# Patient Record
Sex: MALE | Race: BLACK OR AFRICAN AMERICAN | Employment: UNEMPLOYED | URBAN - METROPOLITAN AREA
[De-identification: names, ages, dates, MRNs, and addresses within clinical notes are randomized per-mention and may not be internally consistent; named-entity substitution may affect disease eponyms.]

---

## 2023-02-26 ENCOUNTER — APPOINTMENT (OUTPATIENT)
Dept: GENERAL RADIOLOGY | Facility: HOSPITAL | Age: 28
End: 2023-02-26
Attending: EMERGENCY MEDICINE
Payer: MEDICAID

## 2023-02-26 ENCOUNTER — HOSPITAL ENCOUNTER (OUTPATIENT)
Facility: HOSPITAL | Age: 28
Setting detail: OBSERVATION
Discharge: HOME | End: 2023-02-27
Attending: EMERGENCY MEDICINE | Admitting: ORTHOPAEDIC SURGERY
Payer: MEDICAID

## 2023-02-26 ENCOUNTER — ANESTHESIA EVENT (OUTPATIENT)
Dept: SURGERY | Facility: HOSPITAL | Age: 28
End: 2023-02-26
Payer: MEDICAID

## 2023-02-26 ENCOUNTER — APPOINTMENT (OUTPATIENT)
Dept: CT IMAGING | Facility: HOSPITAL | Age: 28
End: 2023-02-26
Attending: EMERGENCY MEDICINE
Payer: MEDICAID

## 2023-02-26 DIAGNOSIS — S63.266A CLOSED DISLOCATION OF FIFTH METACARPAL BONE OF RIGHT HAND: Primary | ICD-10-CM

## 2023-02-26 DIAGNOSIS — T14.8XXA FRACTURE: Primary | ICD-10-CM

## 2023-02-26 LAB
ALBUMIN SERPL-MCNC: 3.1 G/DL (ref 3.4–5)
ALBUMIN/GLOB SERPL: 1 {RATIO} (ref 1–2)
ALP LIVER SERPL-CCNC: 55 U/L
ALT SERPL-CCNC: 41 U/L
ANION GAP SERPL CALC-SCNC: 5 MMOL/L (ref 0–18)
AST SERPL-CCNC: 49 U/L (ref 15–37)
BASOPHILS # BLD AUTO: 0.05 X10(3) UL (ref 0–0.2)
BASOPHILS NFR BLD AUTO: 0.8 %
BILIRUB SERPL-MCNC: 0.2 MG/DL (ref 0.1–2)
BUN BLD-MCNC: 9 MG/DL (ref 7–18)
CALCIUM BLD-MCNC: 7.9 MG/DL (ref 8.5–10.1)
CHLORIDE SERPL-SCNC: 111 MMOL/L (ref 98–112)
CO2 SERPL-SCNC: 26 MMOL/L (ref 21–32)
CREAT BLD-MCNC: 1.24 MG/DL
EOSINOPHIL # BLD AUTO: 0.07 X10(3) UL (ref 0–0.7)
EOSINOPHIL NFR BLD AUTO: 1.1 %
ERYTHROCYTE [DISTWIDTH] IN BLOOD BY AUTOMATED COUNT: 11.9 %
ETHANOL SERPL-MCNC: 195 MG/DL (ref ?–3)
GFR SERPLBLD BASED ON 1.73 SQ M-ARVRAT: 81 ML/MIN/1.73M2 (ref 60–?)
GLOBULIN PLAS-MCNC: 3 G/DL (ref 2.8–4.4)
GLUCOSE BLD-MCNC: 97 MG/DL (ref 70–99)
HCT VFR BLD AUTO: 42.7 %
HGB BLD-MCNC: 14.6 G/DL
IMM GRANULOCYTES # BLD AUTO: 0.02 X10(3) UL (ref 0–1)
IMM GRANULOCYTES NFR BLD: 0.3 %
LYMPHOCYTES # BLD AUTO: 2.54 X10(3) UL (ref 1–4)
LYMPHOCYTES NFR BLD AUTO: 40.4 %
MCH RBC QN AUTO: 30.9 PG (ref 26–34)
MCHC RBC AUTO-ENTMCNC: 34.2 G/DL (ref 31–37)
MCV RBC AUTO: 90.3 FL
MONOCYTES # BLD AUTO: 0.59 X10(3) UL (ref 0.1–1)
MONOCYTES NFR BLD AUTO: 9.4 %
NEUTROPHILS # BLD AUTO: 3.02 X10 (3) UL (ref 1.5–7.7)
NEUTROPHILS # BLD AUTO: 3.02 X10(3) UL (ref 1.5–7.7)
NEUTROPHILS NFR BLD AUTO: 48 %
OSMOLALITY SERPL CALC.SUM OF ELEC: 293 MOSM/KG (ref 275–295)
PLATELET # BLD AUTO: 204 10(3)UL (ref 150–450)
POTASSIUM SERPL-SCNC: 3.7 MMOL/L (ref 3.5–5.1)
PROT SERPL-MCNC: 6.1 G/DL (ref 6.4–8.2)
RBC # BLD AUTO: 4.73 X10(6)UL
SARS-COV-2 RNA RESP QL NAA+PROBE: NOT DETECTED
SODIUM SERPL-SCNC: 142 MMOL/L (ref 136–145)
WBC # BLD AUTO: 6.3 X10(3) UL (ref 4–11)

## 2023-02-26 PROCEDURE — 99285 EMERGENCY DEPT VISIT HI MDM: CPT

## 2023-02-26 PROCEDURE — 99152 MOD SED SAME PHYS/QHP 5/>YRS: CPT

## 2023-02-26 PROCEDURE — 73200 CT UPPER EXTREMITY W/O DYE: CPT | Performed by: EMERGENCY MEDICINE

## 2023-02-26 PROCEDURE — 73130 X-RAY EXAM OF HAND: CPT | Performed by: EMERGENCY MEDICINE

## 2023-02-26 PROCEDURE — 36415 COLL VENOUS BLD VENIPUNCTURE: CPT

## 2023-02-26 PROCEDURE — 80053 COMPREHEN METABOLIC PANEL: CPT | Performed by: EMERGENCY MEDICINE

## 2023-02-26 PROCEDURE — 85025 COMPLETE CBC W/AUTO DIFF WBC: CPT | Performed by: EMERGENCY MEDICINE

## 2023-02-26 PROCEDURE — 82077 ASSAY SPEC XCP UR&BREATH IA: CPT | Performed by: EMERGENCY MEDICINE

## 2023-02-26 RX ORDER — HYDROCODONE BITARTRATE AND ACETAMINOPHEN 5; 325 MG/1; MG/1
1 TABLET ORAL EVERY 6 HOURS PRN
Status: DISCONTINUED | OUTPATIENT
Start: 2023-02-26 | End: 2023-02-27

## 2023-02-26 RX ORDER — SODIUM CHLORIDE 9 MG/ML
INJECTION, SOLUTION INTRAVENOUS CONTINUOUS
Status: DISCONTINUED | OUTPATIENT
Start: 2023-02-26 | End: 2023-02-27

## 2023-02-26 RX ORDER — ONDANSETRON 2 MG/ML
4 INJECTION INTRAMUSCULAR; INTRAVENOUS EVERY 6 HOURS PRN
Status: DISCONTINUED | OUTPATIENT
Start: 2023-02-26 | End: 2023-02-27

## 2023-02-26 RX ORDER — HYDROMORPHONE HYDROCHLORIDE 1 MG/ML
0.2 INJECTION, SOLUTION INTRAMUSCULAR; INTRAVENOUS; SUBCUTANEOUS
Status: DISCONTINUED | OUTPATIENT
Start: 2023-02-26 | End: 2023-02-27

## 2023-02-26 NOTE — ED QUICK NOTES
Orders for admission, patient is aware of plan and ready to go upstairs. Any questions, please call ED ADY Anand at [id number]. Vaccinated? Type of COVID test sent:rapid  COVID Suspicion level: negative      Titratable drug(s) infusing:  Rate:    LOC at time of transport: A+Ox4  Other pertinent information:    CIWA score=  NIH score=

## 2023-02-26 NOTE — PLAN OF CARE
Patient awaiting surgery tomorrow. Pain controlled on dilaudid. Vitals stable, hand numb, MD aware. Problem: PAIN - ADULT  Goal: Verbalizes/displays adequate comfort level or patient's stated pain goal  Description: INTERVENTIONS:  - Encourage pt to monitor pain and request assistance  - Assess pain using appropriate pain scale  - Administer analgesics based on type and severity of pain and evaluate response  - Implement non-pharmacological measures as appropriate and evaluate response  - Consider cultural and social influences on pain and pain management  - Manage/alleviate anxiety  - Utilize distraction and/or relaxation techniques  - Monitor for opioid side effects  - Notify MD/LIP if interventions unsuccessful or patient reports new pain  - Anticipate increased pain with activity and pre-medicate as appropriate  Outcome: Progressing     Problem: RISK FOR INFECTION - ADULT  Goal: Absence of fever/infection during anticipated neutropenic period  Description: INTERVENTIONS  - Monitor WBC  - Administer growth factors as ordered  - Implement neutropenic guidelines  Outcome: Progressing     Problem: SAFETY ADULT - FALL  Goal: Free from fall injury  Description: INTERVENTIONS:  - Assess pt frequently for physical needs  - Identify cognitive and physical deficits and behaviors that affect risk of falls.   - [place] fall precautions as indicated by assessment.  - Educate pt/family on patient safety including physical limitations  - Instruct pt to call for assistance with activity based on assessment  - Modify environment to reduce risk of injury  - Provide assistive devices as appropriate  - Consider OT/PT consult to assist with strengthening/mobility  - Encourage toileting schedule  Outcome: Progressing     Problem: DISCHARGE PLANNING  Goal: Discharge to home or other facility with appropriate resources  Description: INTERVENTIONS:  - Identify barriers to discharge w/pt and caregiver  - Include patient/family/discharge partner in discharge planning  - Arrange for needed discharge resources and transportation as appropriate  - Identify discharge learning needs (meds, wound care, etc)  - Arrange for interpreters to assist at discharge as needed  - Consider post-discharge preferences of patient/family/discharge partner  - Complete POLST form as appropriate  - Assess patient's ability to be responsible for managing their own health  - Refer to Case Management Department for coordinating discharge planning if the patient needs post-hospital services based on physician/LIP order or complex needs related to functional status, cognitive ability or social support system  Outcome: Progressing

## 2023-02-26 NOTE — CONSULTS
Consult received    Right 4th and 5th metacarpal base fracture dislocations    Will read Ct    Admit to observation  NPO  OR this morning for right hand closed reduction versus open reduction and pinning of 4th and 5th carpometacarpal joints    Pain control    Jailene Wilson MD  [facility]

## 2023-02-26 NOTE — PROGRESS NOTES
NURSING ADMISSION NOTE      Patient admitted  Oriented to room. Safety precautions initiated. Bed in low position. Call light in reach. In bed, started to unwrap ace bandage that surrounds splint to fingers/wrist, d/t it being \"tight\" & causing pain. On call ortho apn called back see orders. Updated on numbness to fingertips&  pain med request. No internal med doc needed per ortho apn.

## 2023-02-27 ENCOUNTER — ANESTHESIA (OUTPATIENT)
Dept: SURGERY | Facility: HOSPITAL | Age: 28
End: 2023-02-27
Payer: MEDICAID

## 2023-02-27 VITALS
HEART RATE: 67 BPM | WEIGHT: 220 LBS | DIASTOLIC BLOOD PRESSURE: 77 MMHG | SYSTOLIC BLOOD PRESSURE: 137 MMHG | RESPIRATION RATE: 15 BRPM | OXYGEN SATURATION: 100 % | TEMPERATURE: 99 F

## 2023-02-27 PROCEDURE — 0RSS0ZZ REPOSITION RIGHT CARPOMETACARPAL JOINT, OPEN APPROACH: ICD-10-PCS | Performed by: ORTHOPAEDIC SURGERY

## 2023-02-27 PROCEDURE — 0PSP04Z REPOSITION RIGHT METACARPAL WITH INTERNAL FIXATION DEVICE, OPEN APPROACH: ICD-10-PCS | Performed by: ORTHOPAEDIC SURGERY

## 2023-02-27 PROCEDURE — 0PSM04Z REPOSITION RIGHT CARPAL WITH INTERNAL FIXATION DEVICE, OPEN APPROACH: ICD-10-PCS | Performed by: ORTHOPAEDIC SURGERY

## 2023-02-27 DEVICE — WIRE K SMALL .045: Type: IMPLANTABLE DEVICE | Site: HAND | Status: FUNCTIONAL

## 2023-02-27 RX ORDER — SODIUM CHLORIDE, SODIUM LACTATE, POTASSIUM CHLORIDE, CALCIUM CHLORIDE 600; 310; 30; 20 MG/100ML; MG/100ML; MG/100ML; MG/100ML
INJECTION, SOLUTION INTRAVENOUS CONTINUOUS
Status: DISCONTINUED | OUTPATIENT
Start: 2023-02-27 | End: 2023-02-27 | Stop reason: HOSPADM

## 2023-02-27 RX ORDER — HYDROCODONE BITARTRATE AND ACETAMINOPHEN 5; 325 MG/1; MG/1
1 TABLET ORAL ONCE AS NEEDED
Status: DISCONTINUED | OUTPATIENT
Start: 2023-02-27 | End: 2023-02-27 | Stop reason: HOSPADM

## 2023-02-27 RX ORDER — HYDROMORPHONE HYDROCHLORIDE 1 MG/ML
INJECTION, SOLUTION INTRAMUSCULAR; INTRAVENOUS; SUBCUTANEOUS AS NEEDED
Status: DISCONTINUED | OUTPATIENT
Start: 2023-02-27 | End: 2023-02-27 | Stop reason: SURG

## 2023-02-27 RX ORDER — HYDROMORPHONE HYDROCHLORIDE 1 MG/ML
0.6 INJECTION, SOLUTION INTRAMUSCULAR; INTRAVENOUS; SUBCUTANEOUS EVERY 5 MIN PRN
Status: DISCONTINUED | OUTPATIENT
Start: 2023-02-27 | End: 2023-02-27 | Stop reason: HOSPADM

## 2023-02-27 RX ORDER — LIDOCAINE HYDROCHLORIDE 10 MG/ML
INJECTION, SOLUTION INFILTRATION; PERINEURAL AS NEEDED
Status: DISCONTINUED | OUTPATIENT
Start: 2023-02-27 | End: 2023-02-27 | Stop reason: HOSPADM

## 2023-02-27 RX ORDER — DIPHENHYDRAMINE HYDROCHLORIDE 50 MG/ML
12.5 INJECTION INTRAMUSCULAR; INTRAVENOUS AS NEEDED
Status: DISCONTINUED | OUTPATIENT
Start: 2023-02-27 | End: 2023-02-27 | Stop reason: HOSPADM

## 2023-02-27 RX ORDER — MEPERIDINE HYDROCHLORIDE 25 MG/ML
12.5 INJECTION INTRAMUSCULAR; INTRAVENOUS; SUBCUTANEOUS AS NEEDED
Status: DISCONTINUED | OUTPATIENT
Start: 2023-02-27 | End: 2023-02-27 | Stop reason: HOSPADM

## 2023-02-27 RX ORDER — DEXAMETHASONE SODIUM PHOSPHATE 4 MG/ML
VIAL (ML) INJECTION AS NEEDED
Status: DISCONTINUED | OUTPATIENT
Start: 2023-02-27 | End: 2023-02-27 | Stop reason: SURG

## 2023-02-27 RX ORDER — ACETAMINOPHEN 500 MG
1000 TABLET ORAL ONCE AS NEEDED
Status: DISCONTINUED | OUTPATIENT
Start: 2023-02-27 | End: 2023-02-27 | Stop reason: HOSPADM

## 2023-02-27 RX ORDER — MIDAZOLAM HYDROCHLORIDE 1 MG/ML
INJECTION INTRAMUSCULAR; INTRAVENOUS AS NEEDED
Status: DISCONTINUED | OUTPATIENT
Start: 2023-02-27 | End: 2023-02-27 | Stop reason: SURG

## 2023-02-27 RX ORDER — LIDOCAINE HYDROCHLORIDE 10 MG/ML
INJECTION, SOLUTION EPIDURAL; INFILTRATION; INTRACAUDAL; PERINEURAL AS NEEDED
Status: DISCONTINUED | OUTPATIENT
Start: 2023-02-27 | End: 2023-02-27 | Stop reason: SURG

## 2023-02-27 RX ORDER — HYDROMORPHONE HYDROCHLORIDE 1 MG/ML
0.4 INJECTION, SOLUTION INTRAMUSCULAR; INTRAVENOUS; SUBCUTANEOUS EVERY 5 MIN PRN
Status: DISCONTINUED | OUTPATIENT
Start: 2023-02-27 | End: 2023-02-27 | Stop reason: HOSPADM

## 2023-02-27 RX ORDER — MIDAZOLAM HYDROCHLORIDE 1 MG/ML
INJECTION INTRAMUSCULAR; INTRAVENOUS
Status: COMPLETED
Start: 2023-02-27 | End: 2023-02-27

## 2023-02-27 RX ORDER — CEFAZOLIN SODIUM 1 G/3ML
INJECTION, POWDER, FOR SOLUTION INTRAMUSCULAR; INTRAVENOUS AS NEEDED
Status: DISCONTINUED | OUTPATIENT
Start: 2023-02-27 | End: 2023-02-27 | Stop reason: SURG

## 2023-02-27 RX ORDER — ONDANSETRON 2 MG/ML
INJECTION INTRAMUSCULAR; INTRAVENOUS AS NEEDED
Status: DISCONTINUED | OUTPATIENT
Start: 2023-02-27 | End: 2023-02-27 | Stop reason: SURG

## 2023-02-27 RX ORDER — HYDROCODONE BITARTRATE AND ACETAMINOPHEN 5; 325 MG/1; MG/1
1 TABLET ORAL EVERY 4 HOURS PRN
Qty: 20 TABLET | Refills: 0 | Status: SHIPPED | OUTPATIENT
Start: 2023-02-27 | End: 2023-02-27

## 2023-02-27 RX ORDER — HYDROCODONE BITARTRATE AND ACETAMINOPHEN 5; 325 MG/1; MG/1
1-2 TABLET ORAL EVERY 4 HOURS PRN
Qty: 20 TABLET | Refills: 0 | Status: SHIPPED | OUTPATIENT
Start: 2023-02-27

## 2023-02-27 RX ORDER — BUPIVACAINE HYDROCHLORIDE 5 MG/ML
INJECTION, SOLUTION EPIDURAL; INTRACAUDAL AS NEEDED
Status: DISCONTINUED | OUTPATIENT
Start: 2023-02-27 | End: 2023-02-27 | Stop reason: HOSPADM

## 2023-02-27 RX ORDER — NALOXONE HYDROCHLORIDE 0.4 MG/ML
80 INJECTION, SOLUTION INTRAMUSCULAR; INTRAVENOUS; SUBCUTANEOUS AS NEEDED
Status: DISCONTINUED | OUTPATIENT
Start: 2023-02-27 | End: 2023-02-27 | Stop reason: HOSPADM

## 2023-02-27 RX ORDER — MIDAZOLAM HYDROCHLORIDE 1 MG/ML
1 INJECTION INTRAMUSCULAR; INTRAVENOUS EVERY 5 MIN PRN
Status: DISCONTINUED | OUTPATIENT
Start: 2023-02-27 | End: 2023-02-27 | Stop reason: HOSPADM

## 2023-02-27 RX ORDER — HYDROCODONE BITARTRATE AND ACETAMINOPHEN 5; 325 MG/1; MG/1
2 TABLET ORAL ONCE AS NEEDED
Status: DISCONTINUED | OUTPATIENT
Start: 2023-02-27 | End: 2023-02-27 | Stop reason: HOSPADM

## 2023-02-27 RX ORDER — ONDANSETRON 2 MG/ML
4 INJECTION INTRAMUSCULAR; INTRAVENOUS EVERY 6 HOURS PRN
Status: DISCONTINUED | OUTPATIENT
Start: 2023-02-27 | End: 2023-02-27 | Stop reason: HOSPADM

## 2023-02-27 RX ORDER — PROCHLORPERAZINE EDISYLATE 5 MG/ML
5 INJECTION INTRAMUSCULAR; INTRAVENOUS EVERY 8 HOURS PRN
Status: DISCONTINUED | OUTPATIENT
Start: 2023-02-27 | End: 2023-02-27 | Stop reason: HOSPADM

## 2023-02-27 RX ADMIN — CEFAZOLIN SODIUM 2 G: 1 INJECTION, POWDER, FOR SOLUTION INTRAMUSCULAR; INTRAVENOUS at 13:00:00

## 2023-02-27 RX ADMIN — DEXAMETHASONE SODIUM PHOSPHATE 8 MG: 4 MG/ML VIAL (ML) INJECTION at 12:57:00

## 2023-02-27 RX ADMIN — SODIUM CHLORIDE: 9 INJECTION, SOLUTION INTRAVENOUS at 14:16:00

## 2023-02-27 RX ADMIN — LIDOCAINE HYDROCHLORIDE 50 MG: 10 INJECTION, SOLUTION EPIDURAL; INFILTRATION; INTRACAUDAL; PERINEURAL at 12:54:00

## 2023-02-27 RX ADMIN — SODIUM CHLORIDE: 9 INJECTION, SOLUTION INTRAVENOUS at 12:50:00

## 2023-02-27 RX ADMIN — ONDANSETRON 4 MG: 2 INJECTION INTRAMUSCULAR; INTRAVENOUS at 13:17:00

## 2023-02-27 RX ADMIN — HYDROMORPHONE HYDROCHLORIDE 1 MG: 1 INJECTION, SOLUTION INTRAMUSCULAR; INTRAVENOUS; SUBCUTANEOUS at 13:04:00

## 2023-02-27 RX ADMIN — MIDAZOLAM HYDROCHLORIDE 2 MG: 1 INJECTION INTRAMUSCULAR; INTRAVENOUS at 12:49:00

## 2023-02-27 NOTE — PLAN OF CARE
ED admit 2/26 w/ Rt hand Fx, Pt is AAOX4, VSS, room air, Rt hand in post mold and ACE wrap, plan for ORIF today, Pt is up ad rosa, voiding freely to restroom, see MAR for pain meds given, Pt doing well, all needs met, all safety measures in place, call light within reach, will CTM.

## 2023-02-27 NOTE — BRIEF OP NOTE
Pre-Operative Diagnosis:RIGHT HAND FRACTURE AT THE BASE OF THE  4TH AND 5TH CMC AND HAMATE   Post-Operative Diagnosis::RIGHT HAND FRACTURE AT THE BASE OF THE  4TH AND 5TH CMC AND HAMATE    Procedure Performed:   HAND OPEN REDUCTION INTERNAL FIXATION- Right Hand Closed Reduction of 4th and 5th Metacarpal Fracture  AND HAMATE EXCISION    Surgeon(s) and Role: No Crow MD - Primary    Assistant(s):  PA: CORNELL Castillo     Surgical Findings: None     Specimen: None     Estimated Blood Loss: Blood Output: 5 mL (2/27/2023  1:47 PM)      Plan:    Patient is cleared to be discharged to home today once medically stable and pain is well managed. Pain meds prescription signed. Patient is to remain in dressing until follow up visit. He will follow up with Dr. Penelope Henry in clinic in one week.      Pasha Hook  2/27/2023  2:08 PM

## 2023-02-27 NOTE — PLAN OF CARE
Pt AOx4, saturating well on RA, VSS. NPO. Scheduled for ORIF vs CRPP to R hang today. Hand is currently in splint, elevated on pillows. Pt has numbness to hand. MD aware. Pain controlled. Call light within reach, questions answered and needs met.

## 2023-02-27 NOTE — ANESTHESIA PROCEDURE NOTES
Airway  Date/Time: 2/27/2023 12:56 PM  Urgency: elective      General Information and Staff    Patient location during procedure: OR  Anesthesiologist: Eliz Alcantara MD  Resident/CRNA: Jasmina Perez CRNA  Performed: CRNA   Performed by: Jasmina Perez CRNA  Authorized by: Eliz Alcantara MD      Indications and Patient Condition  Indications for airway management: anesthesia  Sedation level: deep  Preoxygenated: yes  Patient position: sniffing  Mask difficulty assessment: 1 - vent by mask    Final Airway Details  Final airway type: supraglottic airway      Successful airway: classic  Size 4      Number of attempts at approach: 1

## 2023-02-27 NOTE — ANESTHESIA POSTPROCEDURE EVALUATION
Carl Arceo Patient Status:  Observation   Age/Gender 28year old male MRN [id number]   Location [address] Attending Shruthi Mathews MD   Hosp Day # 0 PCP None Pcp       Anesthesia Post-op Note    HAND OPEN REDUCTION INTERNAL FIXATION- Right Hand Closed Reduction of 4th and 5th Metacarpal Fracture     Procedure Summary     Date: 02/27/23 Room / Location: [facility] MAIN OR 18 / [facility] OR    Anesthesia Start: 4774 Anesthesia Stop: 2565    Procedure: HAND OPEN REDUCTION INTERNAL FIXATION- Right Hand Closed Reduction of 4th and 5th Metacarpal Fracture (Right: Hand) Diagnosis:       Fracture      (Fracture [T14. Cindy Aidee)    Surgeons: Shruthi Mahtews MD Anesthesiologist: Allie Lefort, MD    Anesthesia Type: general ASA Status: 1          Anesthesia Type: general    Vitals Value Taken Time   /71 02/27/23 1423   Temp 99.1 02/27/23 1423   Pulse 97 02/27/23 1423   Resp 18 02/27/23 1423   SpO2 98% 02/27/23 1423       Patient Location: PACU    Anesthesia Type: general    Airway Patency: patent    Postop Pain Control: adequate    Mental Status: mildly sedated but able to meaningfully participate in the post-anesthesia evaluation    Nausea/Vomiting: none    Cardiopulmonary/Hydration status: stable euvolemic    Complications: no apparent anesthesia related complications    Postop vital signs: stable    Dental Exam: Unchanged from Preop    Patient to be discharged from PACU when criteria met.

## 2023-02-27 NOTE — OPERATIVE REPORT
OPERATIVE REPORT    Patient Name: Mateo Jimenez  Age:  28year old  Sex: male  MRN: [id number]  : 10/3/1995  Date of Admission: 2023  Date of Surgery: 23  Primary Surgeon: Michael Woods MD  Assistant(s): LEON Joshua. Skilled assistance was needed for this case for all portions     Preoperative Diagnosis:   Right hand 4th and 5th carpometacarpal joint dislocations  Right hand 4th metacarpal base fracture   Right hand hamate fracture    Postoperative Diagnosis:   Above      Operation Performed:   Right hand Open treatment of 4th carpometacarpal dislocation (20599)  Right hand Open treatment of 5th carpometacarpal dislocation (64003)  Right hand Open treatment of 4th metacarpal base fracture(41934)  Right hand Open treatment of hamate carpal fracture (excluding carpal scaphoid navicular), each bone (58030)    Implants: 3x .045 K wires with 3x pin caps     Anesthesia: General + local    Complications: none    Estimated Blood Loss: 5 mL    Tourniquet Time: 45 minutes    Specimens: none    Antibiotics: given    INDICATIONS FOR SURGERY:   The patient is a 28year old year-old male with right hand contact injury with fracture disloctions of 4th and 5th CMC joints and hamate fracture. The patient was indicated for closed versus open reduction    The patient understands the natural history of their disease and all their options. Risks and benefits, as well as alternatives, of surgery were discussed in detail. Risks including infection, bleeding, damage to nerves, arteries, tendons, persistent pain, failure of surgery to relieve all pain/dysfunction/deformity, malunion, nonunion (if a bony injury), recurrence of problem, and need for further surgery were all discussed. Risks for anesthesia complications, DVT and death were also discussed  All questions were answered and they agree to the plan.     OPERATIVE FINDINGS:  4th and 5th CMC fracture dislocations with 4th MC base fracture and hamate fracture     OPERATIVE PROCEDURE:  The patient was seen in the preoperative holding area, where the correct site was marked. All questions regarding the procedure and postoperative rehabilitation were discussed. The consent was signed. H&P and allergies were reviewed. The patient was brought to the operating room and transferred to the OR table where General Anesthesia was induced without immediate complication. A time-out was performed confirming laterality and site. A high Right axillary tourniquet was placed and was well padded  The Right extremity was prepped and draped in the standard fashion. A single dose of preoperative antibiotics was administered. 10 cc of 1% lidocaine and 0.5% marcaine in a 50/50 mix without epinephrine was then injected into dorsal ulnar hand   Esmarch bandage was then used to exsanguinate the operative limb and the tourniquet was raised to 250 mm hg     I first attempted to reduce the 4th and 5th CMC joints closed with traction and dorsal pressure,  Reduction was successful but pin fixation was difficult, I ultimately proceeded to open reduction    Right hand Open treatment of 4th carpometacarpal dislocation (54769)  Right hand Open treatment of 5th carpometacarpal dislocation (95303)  Right hand Open treatment of 4th metacarpal base fracture(49737)  Right hand Open treatment of hamate carpal fracture (excluding carpal scaphoid navicular), each bone (78789)  A 5 cm dorsal ulnar hand incision overlying the hamate and 4th and 5th CMC joints was made  Blunt dissection down to the 4th and 5th CMC joints was completed and the EDC tendon was moved to the side  Open reduction of the 4th and 5th CMC joints were then performed with volar pressure.   This reduced the 4th MC base fracture and the hamate fracture  A pin was placed across the bases of 3-5 MC bases as well as 4th and 5th CMC joints  Reductions were confirmed on xray     Tourniquet was then released and the fingers were warm and well perfused     The wound was closed with 4-0 monocryl, nylons      The wound was dressed with bacitracin, adaptic, 4x4s and ulnar gutter splint    The patient was allowed to awaken from the anesthetic, appeared to have tolerated the procedure well without immediate complication, and was transferred to the recovery room in stable condition.      Blanka Davies MD  Orthopedic Surgery

## 2023-02-28 NOTE — PLAN OF CARE
Pt back from PACU voided, pain is controlled, Pt has eaten dinner and feels well enough to discharge home.

## 2023-02-28 NOTE — PLAN OF CARE
NURSING DISCHARGE NOTE    Discharged Home via Wheelchair. Accompanied by self  Belongings Taken by patient/family. AVS printed and discussed, IV removed, Rx for Norco given to Pt, reminded to  medicine at pharmacy. Pt ready to discharge home.

## (undated) DEVICE — UPPER EXTREMITY CDS-LF: Brand: MEDLINE INDUSTRIES, INC.

## (undated) DEVICE — 3M™ STERI-STRIP™ REINFORCED ADHESIVE SKIN CLOSURES, R1547, 1/2 IN X 4 IN (12 MM X 100 MM), 6 STRIPS/ENVELOPE: Brand: 3M™ STERI-STRIP™

## (undated) DEVICE — DISPOSABLE BIPOLAR FORCEPS 4" (10.2CM) JEWELERS, STRAIGHT 0.4MM TIP AND 12 FT. (3.6M) CABLE: Brand: KIRWAN

## (undated) DEVICE — SYRINGE 20CC LL TIP

## (undated) DEVICE — OCCLUSIVE GAUZE STRIP OVERWRAP,3% BISMUTH TRIBROMOPHENATE IN PETROLATUM BLEND: Brand: XEROFORM

## (undated) DEVICE — PADDING CAST COTTON STER 3

## (undated) DEVICE — SUT ETHILON 4-0 FS-1 1629H

## (undated) DEVICE — PIN GUARD 0.045 WHITE

## (undated) DEVICE — NON-ADHERENT STRIPS,OIL EMULSION: Brand: CURITY

## (undated) DEVICE — SUT MONOCRYL 4-0 PS-2 Y426H

## (undated) DEVICE — STERILE POLYISOPRENE POWDER-FREE SURGICAL GLOVES: Brand: PROTEXIS

## (undated) DEVICE — STANDARD HYPODERMIC NEEDLE,POLYPROPYLENE HUB: Brand: MONOJECT

## (undated) DEVICE — DISPOSABLE TOURNIQUET CUFF SINGLE BLADDER, DUAL PORT AND QUICK CONNECT CONNECTOR: Brand: COLOR CUFF

## (undated) DEVICE — OINTMENT CURAD BACITRACIN .3OZ

## (undated) DEVICE — SOL NACL IRRIG 0.9% 1000ML BTL

## (undated) DEVICE — C-ARM: Brand: UNBRANDED

## (undated) DEVICE — 1010 S-DRAPE TOWEL DRAPE 10/BX: Brand: STERI-DRAPE™

## (undated) DEVICE — SLEEVE KENDALL SCD EXPRESS MED

## (undated) DEVICE — BANDAGE ELASTIC ACE 3IN STERIL

## (undated) DEVICE — MEGADYNE ELECTRODE ADULT PT RT

## (undated) DEVICE — GAUZE STERILE 4X4 12PLY

## (undated) DEVICE — PADDING CAST SOFT ROLL 3IN

## (undated) DEVICE — SPLINT PLASTER 3